# Patient Record
Sex: FEMALE | Race: WHITE | Employment: FULL TIME | URBAN - METROPOLITAN AREA
[De-identification: names, ages, dates, MRNs, and addresses within clinical notes are randomized per-mention and may not be internally consistent; named-entity substitution may affect disease eponyms.]

---

## 2017-01-14 ENCOUNTER — HOSPITAL ENCOUNTER (EMERGENCY)
Facility: HOSPITAL | Age: 57
Discharge: HOME | End: 2017-01-14
Attending: EMERGENCY MEDICINE
Payer: COMMERCIAL

## 2017-01-14 ENCOUNTER — APPOINTMENT (OUTPATIENT)
Dept: GENERAL RADIOLOGY | Age: 57
End: 2017-01-14
Attending: FAMILY MEDICINE
Payer: COMMERCIAL

## 2017-01-14 ENCOUNTER — HOSPITAL ENCOUNTER (OUTPATIENT)
Age: 57
Discharge: ANOTHER HOSPITAL | End: 2017-01-14
Attending: FAMILY MEDICINE
Payer: COMMERCIAL

## 2017-01-14 VITALS
TEMPERATURE: 98 F | SYSTOLIC BLOOD PRESSURE: 156 MMHG | BODY MASS INDEX: 24.25 KG/M2 | HEIGHT: 68 IN | WEIGHT: 160 LBS | OXYGEN SATURATION: 99 % | HEART RATE: 66 BPM | RESPIRATION RATE: 18 BRPM | DIASTOLIC BLOOD PRESSURE: 78 MMHG

## 2017-01-14 VITALS
HEIGHT: 68 IN | WEIGHT: 160 LBS | BODY MASS INDEX: 24.25 KG/M2 | SYSTOLIC BLOOD PRESSURE: 176 MMHG | TEMPERATURE: 98 F | HEART RATE: 77 BPM | OXYGEN SATURATION: 99 % | RESPIRATION RATE: 16 BRPM | DIASTOLIC BLOOD PRESSURE: 110 MMHG

## 2017-01-14 DIAGNOSIS — S52.502A CLOSED FRACTURE OF DISTAL END OF LEFT RADIUS, UNSPECIFIED FRACTURE MORPHOLOGY, INITIAL ENCOUNTER: Primary | ICD-10-CM

## 2017-01-14 DIAGNOSIS — S52.512A CLOSED DISPLACED FRACTURE OF STYLOID PROCESS OF LEFT RADIUS, INITIAL ENCOUNTER: Primary | ICD-10-CM

## 2017-01-14 PROCEDURE — 29125 APPL SHORT ARM SPLINT STATIC: CPT

## 2017-01-14 PROCEDURE — 99215 OFFICE O/P EST HI 40 MIN: CPT

## 2017-01-14 PROCEDURE — 73110 X-RAY EXAM OF WRIST: CPT

## 2017-01-14 PROCEDURE — 99214 OFFICE O/P EST MOD 30 MIN: CPT

## 2017-01-14 PROCEDURE — 99283 EMERGENCY DEPT VISIT LOW MDM: CPT

## 2017-01-14 RX ORDER — HYDROCODONE BITARTRATE AND ACETAMINOPHEN 5; 325 MG/1; MG/1
1 TABLET ORAL ONCE
Status: COMPLETED | OUTPATIENT
Start: 2017-01-14 | End: 2017-01-14

## 2017-01-14 RX ORDER — HYDROCODONE BITARTRATE AND ACETAMINOPHEN 5; 325 MG/1; MG/1
1 TABLET ORAL EVERY 6 HOURS PRN
Qty: 12 TABLET | Refills: 0 | Status: SHIPPED | OUTPATIENT
Start: 2017-01-14 | End: 2017-01-17

## 2017-01-14 RX ORDER — SENNA AND DOCUSATE SODIUM 50; 8.6 MG/1; MG/1
2 TABLET, FILM COATED ORAL DAILY
Qty: 10 TABLET | Refills: 0 | Status: SHIPPED | OUTPATIENT
Start: 2017-01-14 | End: 2017-01-16 | Stop reason: ALTCHOICE

## 2017-01-14 RX ORDER — ALBUTEROL SULFATE 2.5 MG/3ML
2.5 SOLUTION RESPIRATORY (INHALATION) ONCE
Status: DISCONTINUED | OUTPATIENT
Start: 2017-01-14 | End: 2017-01-14

## 2017-01-14 NOTE — ED PROVIDER NOTES
Patient Seen in: [facility] In [address] [place] [address]    History   Patient presents with:  Upper Extremity Injury (musculoskeletal)    Stated Complaint: left wrist injury    HPI    HPI: Kristin Lisa is a 61year old female who presents after Recreational  - 4 - 5 / week       Review of Systems    Positive for stated complaint: left wrist injury  Other systems are as noted in HPI. Constitutional and vital signs reviewed. All other systems reviewed and negative except as noted above.     PS and sensation was intact        Disposition and Plan     Clinical Impression:  Closed displaced fracture of styloid process of left radius, initial encounter  (primary encounter diagnosis)    Disposition:   Ic to ed    Follow-up:  No follow-up provider spec

## 2017-01-14 NOTE — ED INITIAL ASSESSMENT (HPI)
Obvious deformity of left wrist fell while walking her dog. Good radial pulse. able to move fingers,no other injury .

## 2017-01-15 NOTE — ED INITIAL ASSESSMENT (HPI)
Slip and fall on ice this afternoon, presented to  care and was told to come over to the er for possible reduction.  Pt has ocl in place

## 2017-01-15 NOTE — ED NOTES
Malik paged numerous times n/a. Sent to the ed for further care and possible closed reduction. Pt verbalized discharge and follow up care and need for ed.

## 2017-01-15 NOTE — ED PROVIDER NOTES
Patient Seen in: [facility] AND [facility] Emergency Department    History   No chief complaint on file.     Stated Complaint: Lt. Wrist injury     HPI    62 yo F without PMH presenting for evaluation of left wrist pain after after inadvertently slipping while walk Cardiovascular: Negative for chest pain and palpitations. Musculoskeletal: Negative for joint swelling and arthralgias. (+) left wrist pain. Other systems are as noted in HPI. Constitutional and vital signs reviewed.       PSFH elements reviewed fro abnormalities are seen. * There is soft tissue swelling. MDM     DIFFERENTIAL DIAGNOSIS: After history and physical exam differential diagnosis was considered for fracture.     Pulse ox: 100%:Normal on RA, as interpreted by myself    Evaluation for l

## 2017-01-16 PROBLEM — S52.532A CLOSED COLLES' FRACTURE OF LEFT RADIUS, INITIAL ENCOUNTER: Status: ACTIVE | Noted: 2017-01-16

## 2017-02-01 PROBLEM — S52.552A CLOSED EXTRAARTICULAR FRACTURE OF DISTAL RADIUS, LEFT, INITIAL ENCOUNTER: Status: ACTIVE | Noted: 2017-02-01

## 2017-02-20 PROBLEM — M25.632 WRIST STIFFNESS, LEFT: Status: ACTIVE | Noted: 2017-02-20

## 2017-04-03 PROBLEM — Z85.3 HISTORY OF BREAST CANCER: Status: ACTIVE | Noted: 2017-04-03

## 2017-04-03 PROCEDURE — 88175 CYTOPATH C/V AUTO FLUID REDO: CPT | Performed by: FAMILY MEDICINE

## 2017-04-03 PROCEDURE — 87624 HPV HI-RISK TYP POOLED RSLT: CPT | Performed by: FAMILY MEDICINE

## 2017-05-08 ENCOUNTER — APPOINTMENT (OUTPATIENT)
Dept: LAB | Age: 57
End: 2017-05-08
Attending: FAMILY MEDICINE
Payer: COMMERCIAL

## 2017-05-08 DIAGNOSIS — R63.5 WEIGHT GAIN: ICD-10-CM

## 2017-05-08 DIAGNOSIS — M85.80 OSTEOPENIA DETERMINED BY X-RAY: ICD-10-CM

## 2017-05-08 DIAGNOSIS — E78.5 HYPERLIPIDEMIA, UNSPECIFIED HYPERLIPIDEMIA TYPE: ICD-10-CM

## 2017-05-08 PROCEDURE — 86141 C-REACTIVE PROTEIN HS: CPT

## 2017-05-08 PROCEDURE — 80061 LIPID PANEL: CPT

## 2017-05-08 PROCEDURE — 84443 ASSAY THYROID STIM HORMONE: CPT

## 2017-05-08 PROCEDURE — 80053 COMPREHEN METABOLIC PANEL: CPT

## 2017-05-08 PROCEDURE — 36415 COLL VENOUS BLD VENIPUNCTURE: CPT

## 2017-05-08 PROCEDURE — 82306 VITAMIN D 25 HYDROXY: CPT

## 2017-05-09 NOTE — PROGRESS NOTES
Quick Note:    Thyroid is normal   CMP is essentially normal (Isolated bilirubin elevation has been present chronically and is of no clinical significance)   Lipid - Showing improved LDL Previously cardiac calcium score is zero Observe -   -hsCRP is averag

## 2017-07-24 PROCEDURE — 88305 TISSUE EXAM BY PATHOLOGIST: CPT | Performed by: INTERNAL MEDICINE

## 2018-11-13 PROBLEM — Z85.3 HX: BREAST CANCER: Status: ACTIVE | Noted: 2017-04-03

## 2018-11-13 PROCEDURE — 86141 C-REACTIVE PROTEIN HS: CPT | Performed by: FAMILY MEDICINE

## 2021-06-01 PROBLEM — Z85.3 HX: BREAST CANCER: Status: RESOLVED | Noted: 2017-04-03 | Resolved: 2021-06-01

## 2021-06-01 PROBLEM — S52.552A CLOSED EXTRAARTICULAR FRACTURE OF DISTAL RADIUS, LEFT, INITIAL ENCOUNTER: Status: RESOLVED | Noted: 2017-02-01 | Resolved: 2021-06-01

## 2021-06-01 PROBLEM — M25.632 WRIST STIFFNESS, LEFT: Status: RESOLVED | Noted: 2017-02-20 | Resolved: 2021-06-01

## 2021-06-01 PROBLEM — S52.532A CLOSED COLLES' FRACTURE OF LEFT RADIUS, INITIAL ENCOUNTER: Status: RESOLVED | Noted: 2017-01-16 | Resolved: 2021-06-01

## 2021-07-15 ENCOUNTER — OFFICE VISIT (OUTPATIENT)
Dept: FAMILY MEDICINE CLINIC | Facility: CLINIC | Age: 61
End: 2021-07-15
Payer: COMMERCIAL

## 2021-07-15 VITALS
SYSTOLIC BLOOD PRESSURE: 138 MMHG | HEART RATE: 87 BPM | HEIGHT: 68 IN | OXYGEN SATURATION: 96 % | RESPIRATION RATE: 20 BRPM | BODY MASS INDEX: 27.49 KG/M2 | WEIGHT: 181.38 LBS | DIASTOLIC BLOOD PRESSURE: 90 MMHG | TEMPERATURE: 99 F

## 2021-07-15 DIAGNOSIS — R39.9 UTI SYMPTOMS: Primary | ICD-10-CM

## 2021-07-15 LAB
APPEARANCE: CLEAR
BILIRUBIN: NEGATIVE
GLUCOSE (URINE DIPSTICK): 100 MG/DL
KETONES (URINE DIPSTICK): NEGATIVE MG/DL
MULTISTIX LOT#: 4011 NUMERIC
NITRITE, URINE: POSITIVE
PH, URINE: 6.5 (ref 4.5–8)
PROTEIN (URINE DIPSTICK): >=300 MG/DL
SPECIFIC GRAVITY: 1.02 (ref 1–1.03)
UROBILINOGEN,SEMI-QN: 1 MG/DL (ref 0–1.9)

## 2021-07-15 PROCEDURE — 81003 URINALYSIS AUTO W/O SCOPE: CPT | Performed by: NURSE PRACTITIONER

## 2021-07-15 PROCEDURE — 3075F SYST BP GE 130 - 139MM HG: CPT | Performed by: NURSE PRACTITIONER

## 2021-07-15 PROCEDURE — 3080F DIAST BP >= 90 MM HG: CPT | Performed by: NURSE PRACTITIONER

## 2021-07-15 PROCEDURE — 3008F BODY MASS INDEX DOCD: CPT | Performed by: NURSE PRACTITIONER

## 2021-07-15 PROCEDURE — 99202 OFFICE O/P NEW SF 15 MIN: CPT | Performed by: NURSE PRACTITIONER

## 2021-07-15 RX ORDER — NITROFURANTOIN 25; 75 MG/1; MG/1
100 CAPSULE ORAL 2 TIMES DAILY
Qty: 14 CAPSULE | Refills: 0 | Status: SHIPPED | OUTPATIENT
Start: 2021-07-15 | End: 2021-07-22

## 2021-07-15 NOTE — PATIENT INSTRUCTIONS
· Complete full course of antibiotics. · Over the counter Tylenol/Motrin as needed for pain/discomfort. · Follow up with your PCP in 2-3 days if symptoms do not improve or worsen. You will need an urine culture.    · Return to clinic or see your primary

## 2021-07-15 NOTE — PROGRESS NOTES
CHIEF COMPLAINT:   Patient presents with:  UTI: 1-3 days painful urination frequent urgent urination back pain OTC AZO last taken today at 12pm       HPI:   Samy Staton is a 63year old female who presents with symptoms of UTI.  Complaining of urinary use: No        REVIEW OF SYSTEMS:   GENERAL: Denies fever, chills, or body aches; good appetite  SKIN: no rashes, no skin wounds or ulcers.   EYES:denies blurred vision or double vision  HEENT: no congestion, rhinorrhea, sore throat or ear pain  CARDIOVASCU Nitrites. Meds as listed below. Risk and benefits of medication discussed. GFR 99. She has attempted to get into her MD's office but there has been an issue with the phone lines. She declined urine culture because we do not take her HMO.   If symptoms p

## (undated) NOTE — ED AVS SNAPSHOT
[facility] Emergency Department    [address]     [place] [address]    Phone:  [phone]    Fax:  [phone]           Gabrielwestley Ike   MRN: [id number]    Department:  [facility] Emergency Department   Date of Visit:  1/ Discharge References/Attachments     FRACTURE OF THE DISTAL RADIUS, UNDERSTANDING (ENGLISH)    SPLINT CARE, DISCHARGE INSTRUCTIONS (ENGLISH)      Disclosure     Insurance plans vary and the physician(s) referred by the ER may not be covered by your plan.  P CARE PHYSICIAN AT ONCE OR RETURN IMMEDIATELY TO THE EMERGENCY DEPARTMENT. If you have been prescribed any medication(s), please fill your prescription right away and begin taking the medication(s) as directed.   If you believe that any of the medications [facility] [address] to help you get signed up for insurance coverage. [address] is a Federal Navigator program that can help with your Affordable Care Act coverage, as well as all types of Medicaid plans.   To get signed up and covere

## (undated) NOTE — ED AVS SNAPSHOT
[facility] Emergency Department    [address]     [place] [address]    Phone:  [phone]    Fax:  [phone]           Shea Lucia   MRN: [id number]    Department:  [facility] Emergency Department   Date of Visit:  1/ and Class Registration line at [phone] or find a doctor online by visiting [email or web address].    IF THERE IS ANY CHANGE OR WORSENING OF YOUR CONDITION, CALL YOUR PRIMARY CARE PHYSICIAN AT ONCE OR RETURN IMMEDIATELY TO [address] [place] [address].     If